# Patient Record
Sex: MALE | Race: WHITE | NOT HISPANIC OR LATINO | Employment: FULL TIME | URBAN - METROPOLITAN AREA
[De-identification: names, ages, dates, MRNs, and addresses within clinical notes are randomized per-mention and may not be internally consistent; named-entity substitution may affect disease eponyms.]

---

## 2019-03-14 ENCOUNTER — OFFICE VISIT (OUTPATIENT)
Dept: URGENT CARE | Facility: CLINIC | Age: 24
End: 2019-03-14
Payer: COMMERCIAL

## 2019-03-14 VITALS
BODY MASS INDEX: 32.58 KG/M2 | HEART RATE: 70 BPM | RESPIRATION RATE: 16 BRPM | WEIGHT: 220 LBS | TEMPERATURE: 98.4 F | HEIGHT: 69 IN | DIASTOLIC BLOOD PRESSURE: 81 MMHG | SYSTOLIC BLOOD PRESSURE: 135 MMHG | OXYGEN SATURATION: 97 %

## 2019-03-14 DIAGNOSIS — J06.9 ACUTE URI: Primary | ICD-10-CM

## 2019-03-14 PROCEDURE — 99203 OFFICE O/P NEW LOW 30 MIN: CPT

## 2019-03-14 RX ORDER — AZITHROMYCIN 250 MG/1
TABLET, FILM COATED ORAL
Qty: 6 TABLET | Refills: 0 | Status: SHIPPED | OUTPATIENT
Start: 2019-03-14 | End: 2019-03-18

## 2019-03-14 RX ORDER — FEXOFENADINE HCL AND PSEUDOEPHEDRINE HCI 60; 120 MG/1; MG/1
1 TABLET, EXTENDED RELEASE ORAL 2 TIMES DAILY
COMMUNITY

## 2019-03-15 NOTE — PROGRESS NOTES
[id number] [facility]        NAME: Elier Ruiz is a 20 y o  male  : 1995    MRN: [id number]  DATE: March 15, 2019  TIME: 11:22 AM    Assessment and Plan   Acute URI [J06 9]  1  Acute URI  azithromycin (ZITHROMAX) 250 mg tablet         Patient Instructions     Take medicine as prescribed  otc meds as needed for sxs control  Follow up with PCP in 3-5 days  Proceed to  ER if symptoms worsen  Chief Complaint     Chief Complaint   Patient presents with    Cold Like Symptoms     c/o congestion, cough productive of yellow/brown sputum now a little greenish, no fever         History of Present Illness       Cough   This is a new problem  The current episode started yesterday  The problem has been unchanged  The problem occurs every few minutes  The cough is non-productive  Associated symptoms include nasal congestion, rhinorrhea and a sore throat  Pertinent negatives include no chest pain, chills, ear congestion, ear pain, fever, headaches, heartburn, hemoptysis, myalgias, postnasal drip, rash, shortness of breath, sweats, weight loss or wheezing  He has tried OTC cough suppressant for the symptoms  The treatment provided mild relief  Review of Systems   Review of Systems   Constitutional: Positive for fatigue  Negative for activity change, appetite change, chills, diaphoresis, fever, unexpected weight change and weight loss  HENT: Positive for congestion, rhinorrhea and sore throat  Negative for ear pain and postnasal drip  Respiratory: Positive for cough and chest tightness  Negative for apnea, hemoptysis, choking, shortness of breath, wheezing and stridor  Cardiovascular: Negative for chest pain, palpitations and leg swelling  Gastrointestinal: Negative for heartburn  Musculoskeletal: Negative for myalgias  Skin: Negative for rash  Neurological: Negative for headaches           Current Medications       Current Outpatient Medications:     fexofenadine-pseudoephedrine (ALLEGRA-D)  MG per tablet, Take 1 tablet by mouth 2 (two) times a day, Disp: , Rfl:     azithromycin (ZITHROMAX) 250 mg tablet, Take 2 tablets today then 1 tablet daily x 4 days, Disp: 6 tablet, Rfl: 0    Current Allergies     Allergies as of 03/14/2019    (No Known Allergies)            The following portions of the patient's history were reviewed and updated as appropriate: allergies, current medications, past family history, past medical history, past social history, past surgical history and problem list      Past Medical History:   Diagnosis Date    Allergic        Past Surgical History:   Procedure Laterality Date    OTHER SURGICAL HISTORY Left     arm       Family History   Problem Relation Age of Onset    No Known Problems Mother     No Known Problems Father          Medications have been verified  Objective   /81   Pulse 70   Temp 98 4 °F (36 9 °C) (Tympanic)   Resp 16   Ht 5' 9" (1 753 m)   Wt 99 8 kg (220 lb)   SpO2 97%   BMI 32 49 kg/m²        Physical Exam     Physical Exam   Constitutional: He appears well-developed and well-nourished  HENT:   Head: Normocephalic  Right Ear: External ear normal    Left Ear: External ear normal    Nose: Mucosal edema and rhinorrhea present  Mouth/Throat: Posterior oropharyngeal erythema present  Cardiovascular: Normal rate, regular rhythm and normal heart sounds  Exam reveals no gallop and no friction rub  No murmur heard  Pulmonary/Chest: Effort normal and breath sounds normal  No stridor  No respiratory distress  He has no decreased breath sounds  He has no wheezes  He has no rhonchi  He has no rales  Harsh dry cough   Abdominal: Soft  Bowel sounds are normal  He exhibits no distension and no mass  There is no tenderness  There is no rebound and no guarding  Lymphadenopathy:     He has cervical adenopathy  Right cervical: Superficial cervical adenopathy present  Left cervical: Superficial cervical adenopathy present

## 2019-04-22 ENCOUNTER — OFFICE VISIT (OUTPATIENT)
Dept: URGENT CARE | Facility: CLINIC | Age: 24
End: 2019-04-22
Payer: COMMERCIAL

## 2019-04-22 VITALS
BODY MASS INDEX: 33.92 KG/M2 | TEMPERATURE: 98.4 F | HEART RATE: 60 BPM | SYSTOLIC BLOOD PRESSURE: 120 MMHG | HEIGHT: 69 IN | DIASTOLIC BLOOD PRESSURE: 78 MMHG | RESPIRATION RATE: 18 BRPM | WEIGHT: 229 LBS | OXYGEN SATURATION: 98 %

## 2019-04-22 DIAGNOSIS — H10.9 BACTERIAL CONJUNCTIVITIS: Primary | ICD-10-CM

## 2019-04-22 PROCEDURE — 99213 OFFICE O/P EST LOW 20 MIN: CPT | Performed by: PHYSICIAN ASSISTANT

## 2019-04-22 RX ORDER — POLYMYXIN B SULFATE AND TRIMETHOPRIM 1; 10000 MG/ML; [USP'U]/ML
1 SOLUTION OPHTHALMIC EVERY 6 HOURS
Qty: 10 ML | Refills: 0 | Status: SHIPPED | OUTPATIENT
Start: 2019-04-22 | End: 2019-04-29